# Patient Record
Sex: MALE | URBAN - METROPOLITAN AREA
[De-identification: names, ages, dates, MRNs, and addresses within clinical notes are randomized per-mention and may not be internally consistent; named-entity substitution may affect disease eponyms.]

---

## 2020-11-18 ENCOUNTER — TRANSFERRED RECORDS (OUTPATIENT)
Dept: HEALTH INFORMATION MANAGEMENT | Facility: CLINIC | Age: 75
End: 2020-11-18

## 2020-11-18 LAB — RETINOPATHY: NEGATIVE

## 2020-12-09 ENCOUNTER — OFFICE VISIT (OUTPATIENT)
Dept: OPHTHALMOLOGY | Facility: CLINIC | Age: 75
End: 2020-12-09
Payer: COMMERCIAL

## 2020-12-09 DIAGNOSIS — H02.9 EYELID LESION: Primary | ICD-10-CM

## 2020-12-09 PROCEDURE — 88305 TISSUE EXAM BY PATHOLOGIST: CPT | Performed by: OPHTHALMOLOGY

## 2020-12-09 PROCEDURE — 99207 PR DROP WITH A PROCEDURE: CPT | Performed by: OPHTHALMOLOGY

## 2020-12-09 PROCEDURE — 67810 INCAL BX EYELID SKN LID MRGN: CPT | Mod: E1 | Performed by: OPHTHALMOLOGY

## 2020-12-09 RX ORDER — CLONAZEPAM 0.5 MG/1
1 TABLET ORAL DAILY
COMMUNITY
Start: 2019-05-10

## 2020-12-09 RX ORDER — ATORVASTATIN CALCIUM 80 MG/1
1 TABLET, FILM COATED ORAL DAILY
COMMUNITY
Start: 2020-11-24

## 2020-12-09 RX ORDER — SENNOSIDES A AND B 8.6 MG/1
2 TABLET, FILM COATED ORAL 2 TIMES DAILY
COMMUNITY
Start: 2019-05-10

## 2020-12-09 RX ORDER — CARBOXYMETHYLCELLULOSE SODIUM 5 MG/ML
1 SOLUTION/ DROPS OPHTHALMIC EVERY 4 HOURS
COMMUNITY

## 2020-12-09 RX ORDER — ACETAMINOPHEN 500 MG
1000 TABLET ORAL 3 TIMES DAILY
COMMUNITY
Start: 2019-05-10

## 2020-12-09 RX ORDER — BENZOCAINE/MENTHOL 6 MG-10 MG
1 LOZENGE MUCOUS MEMBRANE 2 TIMES DAILY
COMMUNITY
Start: 2020-12-08

## 2020-12-09 RX ORDER — GABAPENTIN 300 MG/1
1 CAPSULE ORAL DAILY
COMMUNITY
Start: 2020-06-17

## 2020-12-09 RX ORDER — ASPIRIN 81 MG/1
1 TABLET, DELAYED RELEASE ORAL DAILY
COMMUNITY
Start: 2020-11-24

## 2020-12-09 RX ORDER — POLYETHYLENE GLYCOL 3350 17 G/17G
17 POWDER, FOR SOLUTION ORAL DAILY
COMMUNITY
Start: 2019-05-11

## 2020-12-09 RX ORDER — PREGABALIN 75 MG/1
1 CAPSULE ORAL DAILY
COMMUNITY
Start: 2020-08-04

## 2020-12-09 RX ORDER — DILTIAZEM HYDROCHLORIDE 120 MG/1
120 CAPSULE, EXTENDED RELEASE ORAL DAILY
COMMUNITY
Start: 2020-07-07

## 2020-12-09 RX ORDER — NITROGLYCERIN 0.4 MG/1
1 TABLET SUBLINGUAL PRN
COMMUNITY
Start: 2020-08-26

## 2020-12-09 RX ORDER — TIZANIDINE 2 MG/1
2 TABLET ORAL EVERY 8 HOURS
COMMUNITY
Start: 2020-01-07

## 2020-12-09 RX ORDER — DULOXETIN HYDROCHLORIDE 30 MG/1
60 CAPSULE, DELAYED RELEASE ORAL 2 TIMES DAILY
COMMUNITY

## 2020-12-09 RX ORDER — ISOSORBIDE MONONITRATE 30 MG/1
30 TABLET, EXTENDED RELEASE ORAL DAILY
COMMUNITY
Start: 2020-07-07

## 2020-12-09 RX ORDER — LANOLIN ALCOHOL/MO/W.PET/CERES
6 CREAM (GRAM) TOPICAL AT BEDTIME
COMMUNITY

## 2020-12-09 RX ORDER — ERYTHROMYCIN 5 MG/G
OINTMENT OPHTHALMIC
Qty: 1 TUBE | Refills: 1 | Status: SHIPPED | OUTPATIENT
Start: 2020-12-09

## 2020-12-09 RX ORDER — LIDOCAINE 50 MG/G
1 PATCH TOPICAL DAILY
COMMUNITY
Start: 2019-05-11

## 2020-12-09 ASSESSMENT — TONOMETRY
OS_IOP_MMHG: 11
OD_IOP_MMHG: 10
IOP_METHOD: ICARE

## 2020-12-09 ASSESSMENT — SLIT LAMP EXAM - LIDS: COMMENTS: NORMAL

## 2020-12-09 ASSESSMENT — VISUAL ACUITY
OS_SC: 20/100
OD_SC: 20/50
METHOD: SNELLEN - LINEAR

## 2020-12-09 NOTE — LETTER
"December 14, 2020      Raheel Ortega  [address] [place] [address]  [address]  [facility] [address]        Dear Raheel,    Please see below for your test results. 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 As long as you are healing well, no further care should be necessary.     Resulted Orders   Surgical pathology exam   Result Value Ref Range    Copath Report       Patient Name: RAHEEL ORTEGA  MR#: [id number]  Specimen #: [id number]  Collected: 12/9/2020  Received: 12/10/2020  Reported: 12/14/2020 08:09  Ordering Phy(s): EUGENIA DOMINGUEZ    For improved result formatting, select 'View Enhanced Report Format' under   Linked Documents section.    SPECIMEN(S):  Skin, left upper eyelid    FINAL DIAGNOSIS:  Upper eyelid, left, biopsy: Apocrine hidrocystoma.    I have personally reviewed all specimens and/or slides, including the   listed special stains, and used them  with my medical judgement to determine or confirm the final diagnosis.    Electronically signed out by:    Shelley Cardoso M.D., [facility]    CLINICAL HISTORY:  The patient is a 75 year old male with a lesion of the left upper eyelid.   He undergoes biopsy to rule out  basal cell carcinoma.    GROSS:  The specimen is received in formalin with proper patient identification,   labeled \"left upper eyelid\".  The  specimen consists of a 0.5 x 0.4 cm skin shave which displays a 0.4 by    uro-0.4 and 0.1 cm subdermal cyst. The  resection margin is inked blue and the specimen is sectioned and submitted   in A1. (Dictated by: MICHAEL Whitley 12/10/2020 10:02 AM)    MICROSCOPIC:  The tissue consists of skin. There is a cystic space in the lumen lined by   a bilayer of cuboidal epithelium  with a single focal area of papillomatous proliferation. 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    The technical component of this testing was completed at the [facility], with " the professional component performed   at the General acute hospital[facility], [address] [place] [address],   [place] [address] ([phone])    CPT Codes:  A: 68662-TX4    COLLECTION SITE:  Client: [facility]  [place] [facility]           If you have any questions, please call the clinic to make an appointment.    Sincerely,    Juana Valencia MD  Ophthalmic Plastic & Reconstructive Surgery  Department of Ophthalmology & Visual Neurosciences    [facility]

## 2020-12-09 NOTE — PROGRESS NOTES
Chief Complaint(s) and History of Present Illness(es)     Lesion On Left Upper Lid     Laterality: left upper lid              Comments     Patient referred Dr. Anderson for left lid lesion consultation. Patient   here today with his brother in law, Erika, who is his power of .   Pt states bump has been present for a few months. No pain associated with   the bump, just bothersome and in the way. Using ATs prn    Patient is type 2 diabetic.        No history of cutaneous malignancy.  Assessment & Plan     Neeraj Medina is a 75 year old male with the following diagnoses:   Encounter Diagnosis   Name Primary?     Eyelid lesion Yes     Symptomatic as it is in his lash line and he can see it in his visual axis.    Excisional biopsy.  Favor keratin cyst but rule out basal cell or other         Operative Note - Eyelid Biopsy      Dec 9, 2020    Pre-operative Diagnosis: Lesion left eye Upper Eyelid    Post-operative Diagnosis: Same.    Procedure: Excision of eyelid neoplasm.    Surgeon: Juana Valencia MD    Assistant: None    Anesthesia: Local infiltration with 2% Lidocaine and Epinephrine.    Complications: None.    Estimated blood loss: <5 mL    Specimen: Eyelid neoplasm to pathology.    Procedure: The patient was room and we kept him in his wheel chair as transfer requires a lift. The involved eyelid was infiltrated with local anesthetic.  The area was prepped and draped in the typical sterile fashion.  A tooth forceps was used to elevate the lesion and it was excised at its base with a Christianne scissors.  Hemostasis was obtained with a high temperature cautery.  The excised diameter measured 5 mm.      Closure of wound: Granulation    Dressing: Prescription provided for Erythromycin.    Disposition: The patient left the minor procedure room in stable condition.  I was present for the entire procedure. Juana Valencia MD    Attending Physician Attestation: Complete documentation of historical and  exam elements from today's encounter can be found in the full encounter summary report (not reduplicated in this progress note). I personally obtained the chief complaint(s) and history of present illness. I confirmed and edited as necessary the review of systems, past medical/surgical history, family history, social history, and examination findings as documented by others; and I examined the patient myself. I personally reviewed the relevant tests, images, and reports as documented above. I formulated and edited as necessary the assessment and plan and discussed the findings and management plan with the patient.  -Juana Valencia MD

## 2020-12-09 NOTE — NURSING NOTE
Chief Complaints and History of Present Illnesses   Patient presents with     Lesion On Left Upper Lid       Chief Complaint(s) and History of Present Illness(es)     Lesion On Left Upper Lid     Laterality: left upper lid              Comments     Patient referred Dr. Anderson for left lid lesion consultation. Patient here today with his brother in law, Erika, who is his power of . Pt states bump has been present for a few months. No pain associated with the bump, just bothersome and in the way. Using ATs prn    Patient is type 2 diabetic.                 Donna Donnelly, COA

## 2020-12-09 NOTE — LETTER
12/9/2020         RE: Neeraj Medina  [address]  [address]  [facility] [address]        Dear Dr. Anderson,    Thank you for referring your patient, Neeraj Medina, to the [facility]. Please see a copy of my visit note below.         Chief Complaint(s) and History of Present Illness(es)     Lesion On Left Upper Lid     Laterality: left upper lid              Comments     Patient referred Dr. Anderson for left lid lesion consultation. Patient   here today with his brother in law, Erika, who is his power of .   Pt states bump has been present for a few months. No pain associated with   the bump, just bothersome and in the way. Using ATs prn    Patient is type 2 diabetic.        No history of cutaneous malignancy.  Assessment & Plan     Neeraj Medina is a 75 year old male with the following diagnoses:   Encounter Diagnosis   Name Primary?     Eyelid lesion Yes     Symptomatic as it is in his lash line and he can see it in his visual axis.    Excisional biopsy.  Favor keratin cyst but rule out basal cell or other         Operative Note - Eyelid Biopsy      Dec 9, 2020    Pre-operative Diagnosis: Lesion left eye Upper Eyelid    Post-operative Diagnosis: Same.    Procedure: Excision of eyelid neoplasm.    Surgeon: Juana Valencia MD    Assistant: None    Anesthesia: Local infiltration with 2% Lidocaine and Epinephrine.    Complications: None.    Estimated blood loss: <5 mL    Specimen: Eyelid neoplasm to pathology.    Procedure: The patient was room and we kept him in his wheel chair as transfer requires a lift. The involved eyelid was infiltrated with local anesthetic.  The area was prepped and draped in the typical sterile fashion.  A tooth forceps was used to elevate the lesion and it was excised at its base with a Christianne scissors.  Hemostasis was obtained with a high temperature cautery.  The excised diameter measured 5 mm.      Closure of wound:  Granulation    Dressing: Prescription provided for Erythromycin.    Disposition: The patient left the minor procedure room in stable condition.  I was present for the entire procedure. Juana Valencia MD    Attending Physician Attestation: Complete documentation of historical and exam elements from today's encounter can be found in the full encounter summary report (not reduplicated in this progress note). I personally obtained the chief complaint(s) and history of present illness. I confirmed and edited as necessary the review of systems, past medical/surgical history, family history, social history, and examination findings as documented by others; and I examined the patient myself. I personally reviewed the relevant tests, images, and reports as documented above. I formulated and edited as necessary the assessment and plan and discussed the findings and management plan with the patient.  -Juana Valencia MD          Again, thank you for allowing me to participate in the care of your patient.        Sincerely,        Juana Valencia MD

## 2020-12-14 LAB — COPATH REPORT: NORMAL
